# Patient Record
Sex: OTHER/UNKNOWN | URBAN - METROPOLITAN AREA
[De-identification: names, ages, dates, MRNs, and addresses within clinical notes are randomized per-mention and may not be internally consistent; named-entity substitution may affect disease eponyms.]

---

## 2023-11-15 ENCOUNTER — APPOINTMENT (OUTPATIENT)
Dept: URBAN - METROPOLITAN AREA CLINIC 256 | Age: 53
Setting detail: DERMATOLOGY
End: 2023-11-15

## 2023-11-15 VITALS — WEIGHT: 235 LBS | HEIGHT: 66 IN

## 2023-11-15 DIAGNOSIS — L91.8 OTHER HYPERTROPHIC DISORDERS OF THE SKIN: ICD-10-CM

## 2023-11-15 DIAGNOSIS — D22 MELANOCYTIC NEVI: ICD-10-CM

## 2023-11-15 DIAGNOSIS — D49.2 NEOPLASM OF UNSPECIFIED BEHAVIOR OF BONE, SOFT TISSUE, AND SKIN: ICD-10-CM

## 2023-11-15 DIAGNOSIS — L72.8 OTHER FOLLICULAR CYSTS OF THE SKIN AND SUBCUTANEOUS TISSUE: ICD-10-CM

## 2023-11-15 PROBLEM — D22.122 MELANOCYTIC NEVI OF LEFT LOWER EYELID, INCLUDING CANTHUS: Status: ACTIVE | Noted: 2023-11-15

## 2023-11-15 PROBLEM — D22.5 MELANOCYTIC NEVI OF TRUNK: Status: ACTIVE | Noted: 2023-11-15

## 2023-11-15 PROBLEM — D23.122 OTHER BENIGN NEOPLASM OF SKIN OF LEFT LOWER EYELID, INCLUDING CANTHUS: Status: ACTIVE | Noted: 2023-11-15

## 2023-11-15 PROCEDURE — OTHER ADDITIONAL NOTES: OTHER

## 2023-11-15 PROCEDURE — 99203 OFFICE O/P NEW LOW 30 MIN: CPT

## 2023-11-15 PROCEDURE — OTHER MIPS QUALITY: OTHER

## 2023-11-15 PROCEDURE — OTHER COUNSELING: OTHER

## 2023-11-15 PROCEDURE — OTHER ACQUIRED DEFORMITY COUNSELING: OTHER

## 2023-11-15 ASSESSMENT — LOCATION ZONE DERM
LOCATION ZONE: TRUNK
LOCATION ZONE: NECK
LOCATION ZONE: EYELID

## 2023-11-15 ASSESSMENT — LOCATION DETAILED DESCRIPTION DERM
LOCATION DETAILED: SUPERIOR THORACIC SPINE
LOCATION DETAILED: LEFT MEDIAL CANTHUS
LOCATION DETAILED: LEFT INFERIOR LATERAL NECK

## 2023-11-15 ASSESSMENT — LOCATION SIMPLE DESCRIPTION DERM
LOCATION SIMPLE: LEFT EYELID
LOCATION SIMPLE: LEFT ANTERIOR NECK
LOCATION SIMPLE: UPPER BACK

## 2023-11-15 NOTE — HPI: SKIN LESION
Is This A New Presentation, Or A Follow-Up?: Skin Lesion
Is This A New Presentation, Or A Follow-Up?: Growth
Additional History: Patient states the lesion is tender, especially when sleeping.

## 2023-11-15 NOTE — PROCEDURE: ADDITIONAL NOTES
Additional Notes: - Differential diagnosis of hernia \\n- Recommended to see a general surgeon. Gave patient information for [facility]. Additional Notes: - Differential diagnosis of hernia \\n- Recommended to see a general surgeon. Gave patient information for [facility] [place] [facility].

## 2024-02-09 ENCOUNTER — APPOINTMENT (OUTPATIENT)
Dept: URBAN - METROPOLITAN AREA CLINIC 253 | Age: 54
Setting detail: DERMATOLOGY
End: 2024-02-09

## 2024-02-09 VITALS — HEIGHT: 66 IN | WEIGHT: 200.62 LBS | RESPIRATION RATE: 14 BRPM

## 2024-02-09 DIAGNOSIS — L03.01 CELLULITIS OF FINGER: ICD-10-CM

## 2024-02-09 DIAGNOSIS — D17 BENIGN LIPOMATOUS NEOPLASM: ICD-10-CM

## 2024-02-09 PROBLEM — D48.5 NEOPLASM OF UNCERTAIN BEHAVIOR OF SKIN: Status: ACTIVE | Noted: 2024-02-09

## 2024-02-09 PROBLEM — L03.012 CELLULITIS OF LEFT FINGER: Status: ACTIVE | Noted: 2024-02-09

## 2024-02-09 PROCEDURE — OTHER ADDITIONAL NOTES: OTHER

## 2024-02-09 PROCEDURE — 99212 OFFICE O/P EST SF 10 MIN: CPT

## 2024-02-09 PROCEDURE — OTHER MIPS QUALITY: OTHER

## 2024-02-09 PROCEDURE — OTHER COUNSELING: OTHER

## 2024-02-09 ASSESSMENT — LOCATION SIMPLE DESCRIPTION DERM
LOCATION SIMPLE: LEFT INDEX FINGERNAIL
LOCATION SIMPLE: GLUTEAL CLEFT

## 2024-02-09 ASSESSMENT — LOCATION ZONE DERM
LOCATION ZONE: FINGERNAIL
LOCATION ZONE: TRUNK

## 2024-02-09 ASSESSMENT — LOCATION DETAILED DESCRIPTION DERM
LOCATION DETAILED: LEFT INDEX FINGERNAIL
LOCATION DETAILED: GLUTEAL CLEFT

## 2024-02-09 NOTE — PROCEDURE: ADDITIONAL NOTES
Additional Notes: Recommended she soak the finger in a warm water bath for 20 minutes a few times a day. \\nAdvised her to watch for spreading of this oscar and to contact my office if she does. \\nDiscussed potential for a minor infection.\\nIf ecchymosis persists or grows, RTC
Detail Level: Simple
Render Risk Assessment In Note?: no
Additional Notes: She saw Dr. Vilchis in November for this. They discussed seeing [facility]. Discussed this again with her and provided her with their contact information. Recommended she schedule an appt today as concern for potential hernia component. \\n\\nA clinical assistant was present for the exam. Care instructions were explained to the patient in detail. Told patient to call with any concerns or questions. The patient verbalized understanding and agreement of the education provided and the treatment plan. Encouraged patient to schedule a follow up appointment right after visit. At the end of the visit, all questions had been answered and the patient was satisfied with the visit.

## 2024-02-09 NOTE — HPI: SKIN LESION
Is This A New Presentation, Or A Follow-Up?: Skin Lesion
Additional History: 2 days ago the patient noticed a fine line/ridge on the nail of her left index finger. Yesterday there was swelling and pulsating of the digit, this has improved somewhat today. She denies injury to the area.